# Patient Record
Sex: FEMALE | Race: BLACK OR AFRICAN AMERICAN | NOT HISPANIC OR LATINO | Employment: FULL TIME | URBAN - METROPOLITAN AREA
[De-identification: names, ages, dates, MRNs, and addresses within clinical notes are randomized per-mention and may not be internally consistent; named-entity substitution may affect disease eponyms.]

---

## 2021-02-11 ENCOUNTER — HISTORICAL (OUTPATIENT)
Dept: LAB | Facility: HOSPITAL | Age: 26
End: 2021-02-11

## 2022-12-04 ENCOUNTER — HOSPITAL ENCOUNTER (EMERGENCY)
Facility: HOSPITAL | Age: 27
Discharge: HOME | End: 2022-12-04
Attending: INTERNAL MEDICINE
Payer: MEDICAID

## 2022-12-04 VITALS
BODY MASS INDEX: 30.04 KG/M2 | WEIGHT: 198.19 LBS | SYSTOLIC BLOOD PRESSURE: 128 MMHG | OXYGEN SATURATION: 99 % | HEIGHT: 68 IN | RESPIRATION RATE: 16 BRPM | HEART RATE: 78 BPM | DIASTOLIC BLOOD PRESSURE: 67 MMHG | TEMPERATURE: 98 F

## 2022-12-04 DIAGNOSIS — S92.901A CLOSED FRACTURE OF RIGHT FOOT, INITIAL ENCOUNTER: Primary | ICD-10-CM

## 2022-12-04 DIAGNOSIS — R52 PAIN: ICD-10-CM

## 2022-12-04 PROCEDURE — 99283 EMERGENCY DEPT VISIT LOW MDM: CPT

## 2022-12-04 RX ORDER — DICLOFENAC SODIUM 75 MG/1
75 TABLET, DELAYED RELEASE ORAL 2 TIMES DAILY
Qty: 14 TABLET | Refills: 0 | Status: SHIPPED | OUTPATIENT
Start: 2022-12-04 | End: 2022-12-11
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
## 2023-06-18 ENCOUNTER — HOSPITAL ENCOUNTER (EMERGENCY)
Facility: HOSPITAL | Age: 28
Discharge: HOME | End: 2023-06-18
Attending: GENERAL ACUTE CARE HOSPITAL
Payer: MEDICAID

## 2023-06-18 VITALS
WEIGHT: 214 LBS | BODY MASS INDEX: 32.43 KG/M2 | RESPIRATION RATE: 18 BRPM | SYSTOLIC BLOOD PRESSURE: 137 MMHG | TEMPERATURE: 98 F | HEART RATE: 70 BPM | OXYGEN SATURATION: 100 % | DIASTOLIC BLOOD PRESSURE: 79 MMHG | HEIGHT: 68 IN

## 2023-06-18 DIAGNOSIS — R10.84 GENERALIZED ABDOMINAL PAIN AFFECTING PREGNANCY IN FIRST TRIMESTER: ICD-10-CM

## 2023-06-18 DIAGNOSIS — O21.9 NAUSEA AND VOMITING IN PREGNANCY: Primary | ICD-10-CM

## 2023-06-18 DIAGNOSIS — O26.891 GENERALIZED ABDOMINAL PAIN AFFECTING PREGNANCY IN FIRST TRIMESTER: ICD-10-CM

## 2023-06-18 LAB
ALBUMIN SERPL-MCNC: 3.9 G/DL (ref 3.5–5)
ALBUMIN/GLOB SERPL: 1.1 RATIO (ref 1.1–2)
ALP SERPL-CCNC: 70 UNIT/L (ref 40–150)
ALT SERPL-CCNC: 6 UNIT/L (ref 0–55)
APPEARANCE UR: ABNORMAL
AST SERPL-CCNC: 16 UNIT/L (ref 5–34)
B-HCG FREE SERPL-ACNC: ABNORMAL MIU/ML
B-HCG SERPL QL: POSITIVE
BASOPHILS # BLD AUTO: 0.01 X10(3)/MCL
BASOPHILS NFR BLD AUTO: 0.2 %
BILIRUB UR QL STRIP.AUTO: NEGATIVE MG/DL
BILIRUBIN DIRECT+TOT PNL SERPL-MCNC: 0.4 MG/DL
BUN SERPL-MCNC: 10 MG/DL (ref 7–18.7)
CALCIUM SERPL-MCNC: 9.5 MG/DL (ref 8.4–10.2)
CHLORIDE SERPL-SCNC: 107 MMOL/L (ref 98–107)
CO2 SERPL-SCNC: 23 MMOL/L (ref 22–29)
COLOR UR: ABNORMAL
CREAT SERPL-MCNC: 0.71 MG/DL (ref 0.55–1.02)
EOSINOPHIL # BLD AUTO: 0.05 X10(3)/MCL (ref 0–0.9)
EOSINOPHIL NFR BLD AUTO: 1.1 %
ERYTHROCYTE [DISTWIDTH] IN BLOOD BY AUTOMATED COUNT: 14.9 % (ref 11.5–17)
GFR SERPLBLD CREATININE-BSD FMLA CKD-EPI: >60 MLS/MIN/1.73/M2
GLOBULIN SER-MCNC: 3.4 GM/DL (ref 2.4–3.5)
GLUCOSE SERPL-MCNC: 86 MG/DL (ref 74–100)
GLUCOSE UR QL STRIP.AUTO: NEGATIVE MG/DL
HCT VFR BLD AUTO: 33.8 % (ref 37–47)
HGB BLD-MCNC: 10.6 G/DL (ref 12–16)
IMM GRANULOCYTES # BLD AUTO: 0.01 X10(3)/MCL (ref 0–0.04)
IMM GRANULOCYTES NFR BLD AUTO: 0.2 %
KETONES UR QL STRIP.AUTO: NEGATIVE MG/DL
LEUKOCYTE ESTERASE UR QL STRIP.AUTO: NEGATIVE UNIT/L
LYMPHOCYTES # BLD AUTO: 1.19 X10(3)/MCL (ref 0.6–4.6)
LYMPHOCYTES NFR BLD AUTO: 26.9 %
MCH RBC QN AUTO: 26.2 PG (ref 27–31)
MCHC RBC AUTO-ENTMCNC: 31.4 G/DL (ref 33–36)
MCV RBC AUTO: 83.5 FL (ref 80–94)
MONOCYTES # BLD AUTO: 0.65 X10(3)/MCL (ref 0.1–1.3)
MONOCYTES NFR BLD AUTO: 14.7 %
NEUTROPHILS # BLD AUTO: 2.51 X10(3)/MCL (ref 2.1–9.2)
NEUTROPHILS NFR BLD AUTO: 56.9 %
NITRITE UR QL STRIP.AUTO: NEGATIVE
PH UR STRIP.AUTO: 7 [PH]
PLATELET # BLD AUTO: 307 X10(3)/MCL (ref 130–400)
PMV BLD AUTO: 9.2 FL (ref 7.4–10.4)
POTASSIUM SERPL-SCNC: 4.2 MMOL/L (ref 3.5–5.1)
PROT SERPL-MCNC: 7.3 GM/DL (ref 6.4–8.3)
PROT UR QL STRIP.AUTO: NEGATIVE MG/DL
RBC # BLD AUTO: 4.05 X10(6)/MCL (ref 4.2–5.4)
RBC UR QL AUTO: NEGATIVE UNIT/L
SODIUM SERPL-SCNC: 137 MMOL/L (ref 136–145)
SP GR UR STRIP.AUTO: 1.02
UROBILINOGEN UR STRIP-ACNC: 2 MG/DL
WBC # SPEC AUTO: 4.42 X10(3)/MCL (ref 4.5–11.5)

## 2023-06-18 PROCEDURE — 99285 EMERGENCY DEPT VISIT HI MDM: CPT | Mod: 25

## 2023-06-18 PROCEDURE — 81003 URINALYSIS AUTO W/O SCOPE: CPT | Performed by: PHYSICIAN ASSISTANT

## 2023-06-18 PROCEDURE — 80053 COMPREHEN METABOLIC PANEL: CPT | Performed by: PHYSICIAN ASSISTANT

## 2023-06-18 PROCEDURE — 85025 COMPLETE CBC W/AUTO DIFF WBC: CPT | Performed by: PHYSICIAN ASSISTANT

## 2023-06-18 PROCEDURE — 96374 THER/PROPH/DIAG INJ IV PUSH: CPT

## 2023-06-18 PROCEDURE — 84702 CHORIONIC GONADOTROPIN TEST: CPT | Performed by: PHYSICIAN ASSISTANT

## 2023-06-18 PROCEDURE — 63600175 PHARM REV CODE 636 W HCPCS: Performed by: PHYSICIAN ASSISTANT

## 2023-06-18 PROCEDURE — 81025 URINE PREGNANCY TEST: CPT | Performed by: PHYSICIAN ASSISTANT

## 2023-06-18 RX ORDER — ONDANSETRON 2 MG/ML
4 INJECTION INTRAMUSCULAR; INTRAVENOUS
Status: COMPLETED | OUTPATIENT
Start: 2023-06-18 | End: 2023-06-18

## 2023-06-18 RX ORDER — ONDANSETRON 4 MG/1
4 TABLET, ORALLY DISINTEGRATING ORAL EVERY 8 HOURS PRN
Qty: 20 TABLET | Refills: 0 | Status: SHIPPED | OUTPATIENT
Start: 2023-06-18

## 2023-06-18 RX ADMIN — SODIUM CHLORIDE, POTASSIUM CHLORIDE, SODIUM LACTATE AND CALCIUM CHLORIDE 1000 ML: 600; 310; 30; 20 INJECTION, SOLUTION INTRAVENOUS at 06:06

## 2023-06-18 RX ADMIN — ONDANSETRON 4 MG: 2 INJECTION INTRAMUSCULAR; INTRAVENOUS at 06:06

## 2023-06-18 NOTE — ED PROVIDER NOTES
Encounter Date: 2023       History     Chief Complaint   Patient presents with    Nausea    Vomiting     Nausea and vomiting x 1 week. Last cycle was May 8th.      27-year-old  pregnant female presents to ED for evaluation of nausea and vomiting over the last week.  Patient reports that she found out she was pregnant this week.  Complains generalized abdominal pain when vomiting.  Denies any vaginal bleeding or dysuria.  States last menstrual cycle started on 2023.  Patient has not seen OB gyn for this pregnancy yet.  However has appointment with Dr. Meehan next week.    The history is provided by the patient. No  was used.   Review of patient's allergies indicates:  No Known Allergies  History reviewed. No pertinent past medical history.  Past Surgical History:   Procedure Laterality Date     SECTION       History reviewed. No pertinent family history.  Social History     Tobacco Use    Smoking status: Some Days     Types: Vaping with nicotine    Smokeless tobacco: Never   Substance Use Topics    Alcohol use: Not Currently    Drug use: Not Currently     Review of Systems   Constitutional:  Negative for chills, fatigue and fever.   Respiratory:  Negative for shortness of breath.    Cardiovascular:  Negative for chest pain.   Gastrointestinal:  Positive for abdominal pain, nausea and vomiting. Negative for diarrhea.   Genitourinary:  Negative for dysuria, flank pain, frequency, pelvic pain, urgency, vaginal bleeding and vaginal discharge.   Musculoskeletal:  Negative for myalgias.   All other systems reviewed and are negative.    Physical Exam     Initial Vitals [23 1746]   BP Pulse Resp Temp SpO2   121/77 78 18 98 °F (36.7 °C) 98 %      MAP       --         Physical Exam    Vitals reviewed.  Constitutional: She appears well-developed.   HENT:   Head: Normocephalic and atraumatic.   Right Ear: Tympanic membrane and external ear normal.   Left Ear: Tympanic membrane  and external ear normal.   Mouth/Throat: Uvula is midline, oropharynx is clear and moist and mucous membranes are normal. No trismus in the jaw. No uvula swelling. No oropharyngeal exudate, posterior oropharyngeal edema or posterior oropharyngeal erythema.   Eyes: Conjunctivae are normal. Pupils are equal, round, and reactive to light.   Neck: Neck supple.   Normal range of motion.  Cardiovascular:  Normal rate, regular rhythm and normal heart sounds.           Pulmonary/Chest: Breath sounds normal. She has no wheezes. She has no rhonchi. She has no rales.   Abdominal: Abdomen is soft. Bowel sounds are normal. There is no abdominal tenderness. There is no rebound and no guarding.   Genitourinary:    Genitourinary Comments: Exam deferred     Musculoskeletal:         General: Normal range of motion.      Cervical back: Normal range of motion and neck supple.     Neurological: She is alert and oriented to person, place, and time. She has normal strength. No cranial nerve deficit or sensory deficit. GCS score is 15. GCS eye subscore is 4. GCS verbal subscore is 5. GCS motor subscore is 6.   Skin: Skin is warm and dry.   Psychiatric: She has a normal mood and affect.       ED Course   Procedures  Labs Reviewed   URINALYSIS, REFLEX TO URINE CULTURE - Abnormal; Notable for the following components:       Result Value    Appearance, UA Slightly Cloudy (*)     Urobilinogen, UA 2.0 (*)     All other components within normal limits   PREGNANCY TEST, URINE RAPID - Abnormal; Notable for the following components:    Beta hCG Qualitative, Urine Positive (*)     All other components within normal limits   HCG, QUANTITATIVE - Abnormal; Notable for the following components:    Beta Human Chorionic Gonadotropin Quantitative 50,749.69 (*)     All other components within normal limits   CBC WITH DIFFERENTIAL - Abnormal; Notable for the following components:    WBC 4.42 (*)     RBC 4.05 (*)     Hgb 10.6 (*)     Hct 33.8 (*)     MCH 26.2  (*)     MCHC 31.4 (*)     All other components within normal limits   CBC W/ AUTO DIFFERENTIAL    Narrative:     The following orders were created for panel order CBC auto differential.  Procedure                               Abnormality         Status                     ---------                               -----------         ------                     CBC with Differential[[id number]]        Abnormal            Final result                 Please view results for these tests on the individual orders.   COMPREHENSIVE METABOLIC PANEL          Imaging Results              US OB <14 Wks TransAbd & TransVag, Single Gestation (XPD) (Preliminary result)  Result time 06/18/23 19:38:09      Preliminary result by Warren Morales MD (06/18/23 19:38:09)                   Narrative:    START OF REPORT:  Technique: First trimester ultrasound of the pelvis was performed with transabdominal and transvaginal images being obtained.    Comparison: None.    Clinical history: Abd pain, tech impression final image.    FINDINGS:  Uterus: The uterus measures 10.4 x 5.7 x 6.8 cm and with a volume of 208.3 ml. A large heterogenous mass is noted within the uterus measuring approximately 4.9 x 3.0 x 4.7 cm. This likely represents fibroid. Follow-up as clinically indicated.  Intrauterine gestation: A gravid uterus is present with a gestational sac identified and with a fetal heart rate of 104 bpm.  Gestational Sac: A gestational sac is present. The mean sac diameter measures (MSD) 1.9 cm. The gestational age by mean sac diameter (MSD) is 6 weeks 2 days.  Fetal heart tones: The fetal heart rate is 104 beats per minute.  Gestational age: The LMP is 05/08/2023. The gestational age by LMP is 5 weeks 6 days. The Estimated Date of Confinement based on menstrual period is 02/12/2024. The gestational age by ultrasound criteria is 6 weeks 2 days. This corresponds to an ultrasound estimated date of confinement of 02/09/2024. The CRL measures 0.5 cm  and corresponds to 6 weeks and 1 day.    Adnexa:  Right Ovary: The right ovary is not definitely visualized.  Left Ovary: The left ovary measures 3.5 x 2.2 x 2.4 cm and with a volume of 9.5 ml.    Fluid: Minimal free fluid is seen in the cul de sac.      Impression:  1. Minimal free fluid is seen in the cul de sac.  2. A gravid uterus is present with a gestational sac identified and with a fetal heart rate of 104 bpm.  3. A gestational sac is present. The mean sac diameter measures (MSD) 1.9 cm. The gestational age by mean sac diameter (MSD) is 6 weeks.  4. Recommend short term serial clinical laboratory and ultrasound followup. Details as above.                                         Medications   lactated ringers bolus 1,000 mL ( Intravenous Stopped 23)   ondansetron injection 4 mg (4 mg Intravenous Given 23)     Medical Decision Making:   Initial Assessment:   27-year-old  pregnant female presents to ED for evaluation of nausea and vomiting over the last week.  Patient reports that she found out she was pregnant this week.  Complains generalized abdominal pain when vomiting.  Denies any vaginal bleeding or dysuria.  States last menstrual cycle started on 2023.  Patient has not seen OB gyn for this pregnancy yet.  However has appointment with Dr. Meehan next week.  Differential Diagnosis:   Judging by the patient's chief complaint and pertinent history, the patient has the following possible differential diagnoses, including but not limited to the following.  Some of these are deemed to be lower likelihood and some more likely based on my physical exam and history combined with possible lab work and/or imaging studies. Please see the pertinent studies, and refer to the HPI.  Some of these diagnoses will take further evaluation to fully rule out, perhaps as an outpatient and the patient was encouraged to follow up when discharged for more comprehensive evaluation.    ectopic  pregnancy,  threatened , ovarian cyst, ovarian torsion,UTI/pyelo, PID, BV, candidiasis, kidney stone, appendicitis, GERD, hyperemesis    Clinical Tests:   Lab Tests: Ordered and Reviewed  Radiological Study: Ordered and Reviewed  ED Management:  19-year-old pregnant female presents to ED for evaluation of nausea and vomiting with abdominal pain over the last week.  Recently found out she was pregnant.  Denies any vaginal bleeding.  Labs unremarkable.  H&H stable.  Patient given IV fluids and Zofran.  Tolerating liquids while in the ED.  Ultrasound obtained showing IUP.  Will send home with short course of antiemetic.  Discussed follow-up with Dr. Meehan next week as scheduled appointment. Return ED precautions given.  I provided counseling to patient with regard to condition, the treatment plan, specific conditions for return, and the importance of follow up. Detailed written and verbal instructions provided to patient and she expressed a verbal understanding of the discharge instructions and overall management plan. Reiterated the importance of medication administration and safety. Advised patient to follow up with primary care provider in 3-5 days or sooner if needed.  Answered questions at this time. The patient is stable for discharge.                             Clinical Impression:   Final diagnoses:  [O21.9] Nausea and vomiting in pregnancy (Primary)  [O26.891, R10.84] Generalized abdominal pain affecting pregnancy in first trimester        ED Disposition Condition    Discharge Stable          ED Prescriptions       Medication Sig Dispense Start Date End Date Auth. Provider    ondansetron (ZOFRAN-ODT) 4 MG TbDL Take 1 tablet (4 mg total) by mouth every 8 (eight) hours as needed. 20 tablet 2023 -- RIK Rios          Follow-up Information       Follow up With Specialties Details Why Contact Info    Mj Pinzon MD Family Medicine   [address] [place] [address]  [phone]      Al  LEOPOLDO Meehan MD Obstetrics and Gynecology Go to  Critical access hospital appointment [address]  [facility] [address]  [phone]               RIK Rios  06/18/23 2019

## 2023-06-18 NOTE — Clinical Note
"Angela Abbott" Vira was seen and treated in our emergency department on 6/18/2023.  She may return to work on 06/19/2023.       If you have any questions or concerns, please don't hesitate to call.       RN    "

## 2023-06-19 NOTE — DISCHARGE INSTRUCTIONS
Make sure drinking plenty of fluids.  Eat small meals throughout the day to help with nausea and vomiting.  Use Zofran as needed.  Follow-up with Dr. Meehan as scheduled appointment next week.

## 2023-06-25 ENCOUNTER — HOSPITAL ENCOUNTER (EMERGENCY)
Facility: HOSPITAL | Age: 28
Discharge: HOME | End: 2023-06-25
Attending: EMERGENCY MEDICINE
Payer: MEDICAID

## 2023-06-25 VITALS
WEIGHT: 211.19 LBS | DIASTOLIC BLOOD PRESSURE: 84 MMHG | HEART RATE: 78 BPM | OXYGEN SATURATION: 100 % | BODY MASS INDEX: 32.11 KG/M2 | TEMPERATURE: 99 F | RESPIRATION RATE: 18 BRPM | SYSTOLIC BLOOD PRESSURE: 133 MMHG

## 2023-06-25 DIAGNOSIS — R82.998 LEUKOCYTES IN URINE: ICD-10-CM

## 2023-06-25 DIAGNOSIS — O21.9 NAUSEA AND VOMITING IN PREGNANCY: Primary | ICD-10-CM

## 2023-06-25 LAB
ALBUMIN SERPL-MCNC: 4.3 G/DL (ref 3.5–5)
ALBUMIN/GLOB SERPL: 1.1 RATIO (ref 1.1–2)
ALP SERPL-CCNC: 68 UNIT/L (ref 40–150)
ALT SERPL-CCNC: 6 UNIT/L (ref 0–55)
APPEARANCE UR: ABNORMAL
AST SERPL-CCNC: 18 UNIT/L (ref 5–34)
B-HCG SERPL QL: POSITIVE
BACTERIA #/AREA URNS AUTO: ABNORMAL /HPF
BASOPHILS # BLD AUTO: 0.01 X10(3)/MCL
BASOPHILS NFR BLD AUTO: 0.2 %
BILIRUB UR QL STRIP.AUTO: ABNORMAL MG/DL
BILIRUBIN DIRECT+TOT PNL SERPL-MCNC: 0.4 MG/DL
BUN SERPL-MCNC: 10 MG/DL (ref 7–18.7)
CALCIUM SERPL-MCNC: 9.9 MG/DL (ref 8.4–10.2)
CHLORIDE SERPL-SCNC: 103 MMOL/L (ref 98–107)
CO2 SERPL-SCNC: 23 MMOL/L (ref 22–29)
COLOR UR: YELLOW
CREAT SERPL-MCNC: 0.76 MG/DL (ref 0.55–1.02)
EOSINOPHIL # BLD AUTO: 0.01 X10(3)/MCL (ref 0–0.9)
EOSINOPHIL NFR BLD AUTO: 0.2 %
ERYTHROCYTE [DISTWIDTH] IN BLOOD BY AUTOMATED COUNT: 14.4 % (ref 11.5–17)
FLUAV AG UPPER RESP QL IA.RAPID: NOT DETECTED
FLUBV AG UPPER RESP QL IA.RAPID: NOT DETECTED
GFR SERPLBLD CREATININE-BSD FMLA CKD-EPI: >60 MLS/MIN/1.73/M2
GLOBULIN SER-MCNC: 4 GM/DL (ref 2.4–3.5)
GLUCOSE SERPL-MCNC: 102 MG/DL (ref 74–100)
GLUCOSE UR QL STRIP.AUTO: NEGATIVE MG/DL
HCT VFR BLD AUTO: 36.8 % (ref 37–47)
HGB BLD-MCNC: 11.6 G/DL (ref 12–16)
IMM GRANULOCYTES # BLD AUTO: 0 X10(3)/MCL (ref 0–0.04)
IMM GRANULOCYTES NFR BLD AUTO: 0 %
KETONES UR QL STRIP.AUTO: ABNORMAL MG/DL
LEUKOCYTE ESTERASE UR QL STRIP.AUTO: ABNORMAL UNIT/L
LYMPHOCYTES # BLD AUTO: 1.29 X10(3)/MCL (ref 0.6–4.6)
LYMPHOCYTES NFR BLD AUTO: 26.2 %
MAGNESIUM SERPL-MCNC: 2 MG/DL (ref 1.6–2.6)
MCH RBC QN AUTO: 26.1 PG (ref 27–31)
MCHC RBC AUTO-ENTMCNC: 31.5 G/DL (ref 33–36)
MCV RBC AUTO: 82.7 FL (ref 80–94)
MONOCYTES # BLD AUTO: 0.54 X10(3)/MCL (ref 0.1–1.3)
MONOCYTES NFR BLD AUTO: 11 %
MUCOUS THREADS URNS QL MICRO: ABNORMAL /LPF
NEUTROPHILS # BLD AUTO: 3.08 X10(3)/MCL (ref 2.1–9.2)
NEUTROPHILS NFR BLD AUTO: 62.4 %
NITRITE UR QL STRIP.AUTO: NEGATIVE
PH UR STRIP.AUTO: 7 [PH]
PLATELET # BLD AUTO: 325 X10(3)/MCL (ref 130–400)
PMV BLD AUTO: 9.2 FL (ref 7.4–10.4)
POTASSIUM SERPL-SCNC: 3.9 MMOL/L (ref 3.5–5.1)
PROT SERPL-MCNC: 8.3 GM/DL (ref 6.4–8.3)
PROT UR QL STRIP.AUTO: 30 MG/DL
RBC # BLD AUTO: 4.45 X10(6)/MCL (ref 4.2–5.4)
RBC #/AREA URNS AUTO: ABNORMAL /HPF
RBC UR QL AUTO: NEGATIVE UNIT/L
SARS-COV-2 RNA RESP QL NAA+PROBE: NOT DETECTED
SODIUM SERPL-SCNC: 136 MMOL/L (ref 136–145)
SP GR UR STRIP.AUTO: 1.02
SQUAMOUS #/AREA URNS AUTO: ABNORMAL /HPF
UROBILINOGEN UR STRIP-ACNC: 4 MG/DL
WBC # SPEC AUTO: 4.93 X10(3)/MCL (ref 4.5–11.5)
WBC #/AREA URNS AUTO: ABNORMAL /HPF

## 2023-06-25 PROCEDURE — 81001 URINALYSIS AUTO W/SCOPE: CPT | Performed by: NURSE PRACTITIONER

## 2023-06-25 PROCEDURE — 99284 EMERGENCY DEPT VISIT MOD MDM: CPT

## 2023-06-25 PROCEDURE — 25000003 PHARM REV CODE 250: Performed by: NURSE PRACTITIONER

## 2023-06-25 PROCEDURE — 81025 URINE PREGNANCY TEST: CPT | Performed by: NURSE PRACTITIONER

## 2023-06-25 PROCEDURE — 85025 COMPLETE CBC W/AUTO DIFF WBC: CPT | Performed by: NURSE PRACTITIONER

## 2023-06-25 PROCEDURE — 80053 COMPREHEN METABOLIC PANEL: CPT | Performed by: NURSE PRACTITIONER

## 2023-06-25 PROCEDURE — 63600175 PHARM REV CODE 636 W HCPCS: Performed by: NURSE PRACTITIONER

## 2023-06-25 PROCEDURE — 96361 HYDRATE IV INFUSION ADD-ON: CPT

## 2023-06-25 PROCEDURE — 96365 THER/PROPH/DIAG IV INF INIT: CPT

## 2023-06-25 PROCEDURE — 0240U COVID/FLU A&B PCR: CPT | Performed by: NURSE PRACTITIONER

## 2023-06-25 PROCEDURE — 83735 ASSAY OF MAGNESIUM: CPT | Performed by: NURSE PRACTITIONER

## 2023-06-25 RX ORDER — NITROFURANTOIN 25; 75 MG/1; MG/1
100 CAPSULE ORAL
Status: COMPLETED | OUTPATIENT
Start: 2023-06-25 | End: 2023-06-25

## 2023-06-25 RX ORDER — NITROFURANTOIN 25; 75 MG/1; MG/1
100 CAPSULE ORAL 2 TIMES DAILY
Qty: 6 CAPSULE | Refills: 0 | Status: SHIPPED | OUTPATIENT
Start: 2023-06-25 | End: 2023-06-28

## 2023-06-25 RX ADMIN — PROMETHAZINE HYDROCHLORIDE 25 MG: 25 INJECTION INTRAMUSCULAR; INTRAVENOUS at 10:06

## 2023-06-25 RX ADMIN — SODIUM CHLORIDE, POTASSIUM CHLORIDE, SODIUM LACTATE AND CALCIUM CHLORIDE 1000 ML: 600; 310; 30; 20 INJECTION, SOLUTION INTRAVENOUS at 09:06

## 2023-06-25 RX ADMIN — NITROFURANTOIN (MONOHYDRATE/MACROCRYSTALS) 100 MG: 75; 25 CAPSULE ORAL at 11:06

## 2023-06-26 NOTE — ED NOTES
Pt ambulated to ed rm 5 from [facility]. Aaox4. Pt reports n/v for 3 days. Denies diarrhea/fever/abd pain/vag dischage. Pt is 6weeks pregnant. Pt is in no distress. Wctm

## 2023-06-26 NOTE — ED PROVIDER NOTES
Encounter Date: 2023       History     Chief Complaint   Patient presents with    Emesis During Pregnancy     States began on Friday with N/V and frontal headache. 7 weeks preg. . No relief with zofran rx.      The patient is a 27-year-old female who is a , 7 weeks pregnant with significant history of hyperemesis gravidarum for her past pregnancies presents to the ER for evaluation and treatment of nausea and vomiting, severe with a frontal headache ongoing since last Friday.  She states that she is unable to eat or drink anything without extreme vomiting.  Her gyn is Dr. Meehan in [place].  She denies any vaginal bleeding, denies any vaginal discharge, she denies any abdominal pain, states she came into the emergency room for rehydration and nausea medicine.  She denies any sob, denies any cp, denies any sick contacts or diarrhea, afebrile, no dysuria.    Review of patient's allergies indicates:  No Known Allergies  No past medical history on file.  Past Surgical History:   Procedure Laterality Date     SECTION       No family history on file.  Social History     Tobacco Use    Smoking status: Some Days     Types: Vaping with nicotine    Smokeless tobacco: Never   Substance Use Topics    Alcohol use: Not Currently    Drug use: Not Currently     Review of Systems   All other systems reviewed and are negative.    Physical Exam     Initial Vitals [23 192]   BP Pulse Resp Temp SpO2   128/82 71 18 99.1 °F (37.3 °C) 98 %      MAP       --         Physical Exam    Nursing note and vitals reviewed.  Constitutional: She appears well-developed and well-nourished.   HENT:   Head: Normocephalic and atraumatic.   Eyes: Conjunctivae are normal.   Neck: Neck supple.   Cardiovascular:  Normal rate, regular rhythm and normal heart sounds.           Pulmonary/Chest: Breath sounds normal.   Abdominal: Abdomen is soft.   Musculoskeletal:         General: Normal range of motion.      Cervical back: Neck  supple.     Neurological: She is alert and oriented to person, place, and time. She has normal strength. GCS score is 15. GCS eye subscore is 4. GCS verbal subscore is 5. GCS motor subscore is 6.   Skin: Skin is warm and dry.   Psychiatric: She has a normal mood and affect. Her behavior is normal. Judgment and thought content normal.       ED Course   Procedures  Labs Reviewed   URINALYSIS, REFLEX TO URINE CULTURE - Abnormal; Notable for the following components:       Result Value    Appearance, UA SL CLOUDY (*)     Protein, UA 30 (*)     Ketones, UA Trace (*)     Bilirubin, UA Small (*)     Urobilinogen, UA 4.0 (*)     Leukocyte Esterase, UA Trace (*)     All other components within normal limits   PREGNANCY TEST, URINE RAPID - Abnormal; Notable for the following components:    Beta hCG Qualitative, Urine Positive (*)     All other components within normal limits   URINALYSIS, MICROSCOPIC - Abnormal; Notable for the following components:    Mucous, UA Small (*)     All other components within normal limits   COMPREHENSIVE METABOLIC PANEL - Abnormal; Notable for the following components:    Glucose Level 102 (*)     Globulin 4.0 (*)     All other components within normal limits   CBC WITH DIFFERENTIAL - Abnormal; Notable for the following components:    Hgb 11.6 (*)     Hct 36.8 (*)     MCH 26.1 (*)     MCHC 31.5 (*)     All other components within normal limits   COVID/FLU A&B PCR - Normal    Narrative:     The Xpert Xpress SARS-CoV-2/FLU/RSV plus is a rapid, multiplexed real-time PCR test intended for the simultaneous qualitative detection and differentiation of SARS-CoV-2, Influenza A, Influenza B, and respiratory syncytial virus (RSV) viral RNA in either nasopharyngeal swab or nasal swab specimens.         MAGNESIUM - Normal   CBC W/ AUTO DIFFERENTIAL    Narrative:     The following orders were created for panel order CBC auto differential.  Procedure                               Abnormality         Status                      ---------                               -----------         ------                     CBC with Differential[[id number]]        Abnormal            Final result                 Please view results for these tests on the individual orders.     Admission on 06/25/2023, Discharged on 06/25/2023   Component Date Value Ref Range Status    Influenza A PCR 06/25/2023 Not Detected  Not Detected Final    Influenza B PCR 06/25/2023 Not Detected  Not Detected Final    SARS-CoV-2 PCR 06/25/2023 Not Detected  Not Detected, Negative, Invalid Final    Color, UA 06/25/2023 Yellow  Yellow, Light-Yellow, Dark Yellow, Shannan, Straw Final    Appearance, UA 06/25/2023 SL CLOUDY (A)  Clear Final    Specific Gravity, UA 06/25/2023 1.020   Final    pH, UA 06/25/2023 7.0  5.0 - 8.5 Final    Protein, UA 06/25/2023 30 (A)  Negative mg/dL Final    Glucose, UA 06/25/2023 Negative  Negative, Normal mg/dL Final    Ketones, UA 06/25/2023 Trace (A)  Negative mg/dL Final    Blood, UA 06/25/2023 Negative  Negative unit/L Final    Bilirubin, UA 06/25/2023 Small (A)  Negative mg/dL Final    Urobilinogen, UA 06/25/2023 4.0 (A)  0.2, 1.0, Normal mg/dL Final    Nitrites, UA 06/25/2023 Negative  Negative Final    Leukocyte Esterase, UA 06/25/2023 Trace (A)  Negative unit/L Final    Beta hCG Qualitative, Urine 06/25/2023 Positive (A)  Negative Final    Bacteria, UA 06/25/2023 None Seen  None Seen, Rare, Occasional /HPF Final    Mucous, UA 06/25/2023 Small (A)  None Seen /LPF Final    RBC, UA 06/25/2023 0-2  None Seen, 0-2, 3-5, 0-5 /HPF Final    WBC, UA 06/25/2023 0-2  None Seen, 0-2, 3-5, 0-5 /HPF Final    Squamous Epithelial Cells, UA 06/25/2023 Rare  None Seen, Rare, Occasional, Occ /HPF Final    Sodium Level 06/25/2023 136  136 - 145 mmol/L Final    Potassium Level 06/25/2023 3.9  3.5 - 5.1 mmol/L Final    Chloride 06/25/2023 103  98 - 107 mmol/L Final    Carbon Dioxide 06/25/2023 23  22 - 29 mmol/L Final    Glucose Level 06/25/2023 102  (H)  74 - 100 mg/dL Final    Blood Urea Nitrogen 06/25/2023 10.0  7.0 - 18.7 mg/dL Final    Creatinine 06/25/2023 0.76  0.55 - 1.02 mg/dL Final    Calcium Level Total 06/25/2023 9.9  8.4 - 10.2 mg/dL Final    Protein Total 06/25/2023 8.3  6.4 - 8.3 gm/dL Final    Albumin Level 06/25/2023 4.3  3.5 - 5.0 g/dL Final    Globulin 06/25/2023 4.0 (H)  2.4 - 3.5 gm/dL Final    Albumin/Globulin Ratio 06/25/2023 1.1  1.1 - 2.0 ratio Final    Bilirubin Total 06/25/2023 0.4  <=1.5 mg/dL Final    Alkaline Phosphatase 06/25/2023 68  40 - 150 unit/L Final    Alanine Aminotransferase 06/25/2023 6  0 - 55 unit/L Final    Aspartate Aminotransferase 06/25/2023 18  5 - 34 unit/L Final    eGFR 06/25/2023 >60  mls/min/1.73/m2 Final    Magnesium Level 06/25/2023 2.00  1.60 - 2.60 mg/dL Final    WBC 06/25/2023 4.93  4.50 - 11.50 x10(3)/mcL Final    RBC 06/25/2023 4.45  4.20 - 5.40 x10(6)/mcL Final    Hgb 06/25/2023 11.6 (L)  12.0 - 16.0 g/dL Final    Hct 06/25/2023 36.8 (L)  37.0 - 47.0 % Final    MCV 06/25/2023 82.7  80.0 - 94.0 fL Final    MCH 06/25/2023 26.1 (L)  27.0 - 31.0 pg Final    MCHC 06/25/2023 31.5 (L)  33.0 - 36.0 g/dL Final    RDW 06/25/2023 14.4  11.5 - 17.0 % Final    Platelet 06/25/2023 325  130 - 400 x10(3)/mcL Final    MPV 06/25/2023 9.2  7.4 - 10.4 fL Final    Neut % 06/25/2023 62.4  % Final    Lymph % 06/25/2023 26.2  % Final    Mono % 06/25/2023 11.0  % Final    Eos % 06/25/2023 0.2  % Final    Basophil % 06/25/2023 0.2  % Final    Lymph # 06/25/2023 1.29  0.6 - 4.6 x10(3)/mcL Final    Neut # 06/25/2023 3.08  2.1 - 9.2 x10(3)/mcL Final    Mono # 06/25/2023 0.54  0.1 - 1.3 x10(3)/mcL Final    Eos # 06/25/2023 0.01  0 - 0.9 x10(3)/mcL Final    Baso # 06/25/2023 0.01  <=0.2 x10(3)/mcL Final    IG# 06/25/2023 0.00  0 - 0.04 x10(3)/mcL Final    IG% 06/25/2023 0.0  % Final           Imaging Results    None          Medications   lactated ringers bolus 1,000 mL (0 mLs Intravenous Stopped 6/25/23 0875)   promethazine  (PHENERGAN) 25 mg in dextrose 5 % (D5W) 50 mL IVPB (0 mg Intravenous Stopped 6/25/23 2303)   nitrofurantoin (macrocrystal-monohydrate) 100 MG capsule 100 mg (100 mg Oral Given 6/25/23 2318)                 ED Course as of 06/26/23 0109   Sun Jun 25, 2023   2100 Differential diagnosis is include dehydration, UTI, hyperemesis gravidarum, electrolyte imbalance     [EB]   2200 Her physical exam was essentially unremarkable, her labs were essentially unremarkable, her Mag was 2, no white count, no left shift, slightly anemic, no electrolyte imbalance, no viral illness, she does have a slight leukocytosis of her urine for which she will be covered with Macrobid.  She received 1 L of normal saline while in the ED. I discussed the antiemetic with Dr. Fisher and he felt that promethazine was the best choice while in the emergency department.  I discussed the risk versus the benefit of early pregnancy and antiemetics and told her that I will defer outpatient treatment to her gyn.  I advised her to call Dr. Meehan's office in the morning and she states that she will.  Her vital signs remained stable while in the emergency room, she is not vomited while in the ED. I will discharge her at this time. [EB]      ED Course User Index  [EB] NICK Turk                 Clinical Impression:   Final diagnoses:  [O21.9] Nausea and vomiting in pregnancy (Primary)  [R82.998] Leukocytes in urine        ED Disposition Condition    Discharge Stable          ED Prescriptions       Medication Sig Dispense Start Date End Date Auth. Provider    nitrofurantoin, macrocrystal-monohydrate, (MACROBID) 100 MG capsule Take 1 capsule (100 mg total) by mouth 2 (two) times daily. for 3 days 6 capsule 6/25/2023 6/28/2023 NICK Turk          Follow-up Information       Follow up With Specialties Details Why Contact Info    Mj Pinzon MD Family Medicine   [address] [place] [address]  [phone]      Al Meehan,  MD Obstetrics and Gynecology In 1 day  [address]  [facility] [address]  [phone]               Maggie Thrasher, [facility]  06/26/23 0112

## 2023-08-16 ENCOUNTER — HOSPITAL ENCOUNTER (EMERGENCY)
Facility: HOSPITAL | Age: 28
Discharge: HOME | End: 2023-08-16
Attending: EMERGENCY MEDICINE
Payer: MEDICAID

## 2023-08-16 VITALS
BODY MASS INDEX: 33.34 KG/M2 | HEART RATE: 71 BPM | TEMPERATURE: 98 F | HEIGHT: 68 IN | WEIGHT: 220 LBS | DIASTOLIC BLOOD PRESSURE: 87 MMHG | RESPIRATION RATE: 18 BRPM | OXYGEN SATURATION: 100 % | SYSTOLIC BLOOD PRESSURE: 152 MMHG

## 2023-08-16 DIAGNOSIS — R10.9 ABDOMINAL PAIN: ICD-10-CM
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
## 2023-08-16 LAB
APPEARANCE UR: CLEAR
BACTERIA #/AREA URNS AUTO: ABNORMAL /HPF
BILIRUB UR QL STRIP.AUTO: NEGATIVE
COLOR UR: YELLOW
GLUCOSE UR QL STRIP.AUTO: NEGATIVE
KETONES UR QL STRIP.AUTO: NEGATIVE
LEUKOCYTE ESTERASE UR QL STRIP.AUTO: ABNORMAL
MUCOUS THREADS URNS QL MICRO: ABNORMAL /LPF
NITRITE UR QL STRIP.AUTO: NEGATIVE
PH UR STRIP.AUTO: 7 [PH]
PROT UR QL STRIP.AUTO: NEGATIVE
RBC #/AREA URNS AUTO: ABNORMAL /HPF
RBC UR QL AUTO: NEGATIVE
SP GR UR STRIP.AUTO: >=1.03
SQUAMOUS #/AREA URNS AUTO: ABNORMAL /HPF
UROBILINOGEN UR STRIP-ACNC: 4
WBC #/AREA URNS AUTO: ABNORMAL /HPF

## 2023-08-16 PROCEDURE — 81001 URINALYSIS AUTO W/SCOPE: CPT | Performed by: EMERGENCY MEDICINE

## 2023-08-16 PROCEDURE — 99284 EMERGENCY DEPT VISIT MOD MDM: CPT
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
## 2023-08-16 NOTE — Clinical Note
"Angela Abbott" Vira was seen and treated in our emergency department on 8/16/2023.  She may return to work on 08/17/2023.       If you have any questions or concerns, please don't hesitate to call.       RN    "

## 2023-08-16 NOTE — Clinical Note
"Angela Abbott"Vira was seen and treated in our emergency department on 8/16/2023.  She may return to work on 08/18/2023.       If you have any questions or concerns, please don't hesitate to call.      Nelli Gan MD"

## 2025-01-01 ENCOUNTER — HOSPITAL ENCOUNTER (EMERGENCY)
Facility: HOSPITAL | Age: 30
Discharge: HOME | End: 2025-01-02
Attending: EMERGENCY MEDICINE
Payer: MEDICAID

## 2025-01-01 DIAGNOSIS — J10.1 INFLUENZA A: Primary | ICD-10-CM

## 2025-01-01 DIAGNOSIS — O20.0 THREATENED ABORTION: ICD-10-CM

## 2025-01-01 LAB
B-HCG FREE SERPL-ACNC: ABNORMAL MIU/ML
B-HCG UR QL: POSITIVE
BASOPHILS # BLD AUTO: 0.01 X10(3)/MCL
BASOPHILS NFR BLD AUTO: 0.2 %
BILIRUB UR QL STRIP.AUTO: NEGATIVE
CLARITY UR: CLEAR
COLOR UR AUTO: YELLOW
EOSINOPHIL # BLD AUTO: 0.01 X10(3)/MCL (ref 0–0.9)
EOSINOPHIL NFR BLD AUTO: 0.2 %
ERYTHROCYTE [DISTWIDTH] IN BLOOD BY AUTOMATED COUNT: 18 % (ref 11.5–17)
FLUAV AG UPPER RESP QL IA.RAPID: DETECTED
FLUBV AG UPPER RESP QL IA.RAPID: NOT DETECTED
GLUCOSE UR QL STRIP: NEGATIVE
HCT VFR BLD AUTO: 32.7 % (ref 37–47)
HGB BLD-MCNC: 10.2 G/DL (ref 12–16)
HGB UR QL STRIP: NEGATIVE
IMM GRANULOCYTES # BLD AUTO: 0.02 X10(3)/MCL (ref 0–0.04)
IMM GRANULOCYTES NFR BLD AUTO: 0.4 %
KETONES UR QL STRIP: NEGATIVE
LEUKOCYTE ESTERASE UR QL STRIP: NEGATIVE
LYMPHOCYTES # BLD AUTO: 0.84 X10(3)/MCL (ref 0.6–4.6)
LYMPHOCYTES NFR BLD AUTO: 17.4 %
MCH RBC QN AUTO: 24.7 PG (ref 27–31)
MCHC RBC AUTO-ENTMCNC: 31.2 G/DL (ref 33–36)
MCV RBC AUTO: 79.2 FL (ref 80–94)
MONOCYTES # BLD AUTO: 0.78 X10(3)/MCL (ref 0.1–1.3)
MONOCYTES NFR BLD AUTO: 16.2 %
NEUTROPHILS # BLD AUTO: 3.16 X10(3)/MCL (ref 2.1–9.2)
NEUTROPHILS NFR BLD AUTO: 65.6 %
NITRITE UR QL STRIP: NEGATIVE
PH UR STRIP: 6 [PH]
PLATELET # BLD AUTO: 280 X10(3)/MCL (ref 130–400)
PMV BLD AUTO: 8.8 FL (ref 7.4–10.4)
PROT UR QL STRIP: NEGATIVE
RBC # BLD AUTO: 4.13 X10(6)/MCL (ref 4.2–5.4)
RSV A 5' UTR RNA NPH QL NAA+PROBE: NOT DETECTED
SARS-COV-2 RNA RESP QL NAA+PROBE: NOT DETECTED
SP GR UR STRIP.AUTO: 1.01 (ref 1–1.03)
UROBILINOGEN UR STRIP-ACNC: 0.2
WBC # BLD AUTO: 4.82 X10(3)/MCL (ref 4.5–11.5)

## 2025-01-01 PROCEDURE — 81025 URINE PREGNANCY TEST: CPT

## 2025-01-01 PROCEDURE — 0241U COVID/RSV/FLU A&B PCR: CPT

## 2025-01-01 PROCEDURE — 99285 EMERGENCY DEPT VISIT HI MDM: CPT | Mod: 25

## 2025-01-01 PROCEDURE — 84702 CHORIONIC GONADOTROPIN TEST: CPT | Performed by: EMERGENCY MEDICINE

## 2025-01-01 PROCEDURE — 81003 URINALYSIS AUTO W/O SCOPE: CPT

## 2025-01-01 PROCEDURE — 85025 COMPLETE CBC W/AUTO DIFF WBC: CPT | Performed by: EMERGENCY MEDICINE

## 2025-01-01 RX ORDER — BENZONATATE 200 MG/1
200 CAPSULE ORAL 3 TIMES DAILY PRN
Qty: 30 CAPSULE | Refills: 0 | Status: SHIPPED | OUTPATIENT
Start: 2025-01-01 | End: 2025-01-11

## 2025-01-01 NOTE — Clinical Note
"Angela Abbott" Vira was seen and treated in our emergency department on 1/1/2025.  She may return to work on 01/09/2025.       If you have any questions or concerns, please don't hesitate to call.       RN    "

## 2025-01-02 VITALS
WEIGHT: 220 LBS | TEMPERATURE: 98 F | BODY MASS INDEX: 34.53 KG/M2 | HEART RATE: 86 BPM | RESPIRATION RATE: 18 BRPM | DIASTOLIC BLOOD PRESSURE: 82 MMHG | HEIGHT: 67 IN | SYSTOLIC BLOOD PRESSURE: 128 MMHG | OXYGEN SATURATION: 100 %

## 2025-01-02 NOTE — ED PROVIDER NOTES
Encounter Date: 2025       History     Chief Complaint   Patient presents with    Nausea    Headache    Cough     Cough   headache  bodyaches   started 2 days ago   3 at home with flu    6wks preg   spotting today     The history is provided by the patient.   Nausea  This is a new problem. The current episode started more than 2 days ago. The problem has not changed since onset.Associated symptoms include abdominal pain and headaches. Pertinent negatives include no chest pain and no shortness of breath. Nothing aggravates the symptoms. Nothing relieves the symptoms.   Headache   The current episode started in the past 7 days. The problem occurs intermittently. The quality of the pain is described as dull. Associated symptoms include abdominal pain, coughing and nausea. Pertinent negatives include no back pain, fever, sore throat or weakness.   Cough  This is a new problem. The current episode started several days ago. The problem has been unchanged. The cough is Non-productive. The maximum temperature recorded prior to her arrival was 100 - 100.9 F. Associated symptoms include headaches. Pertinent negatives include no chest pain, no sore throat and no shortness of breath.   States several of her kids have the flu.  Also notes she is 6 weeks pregnant and has been having spotting and lower abdominal pain.  She is  8 para 5, Ab 2    Review of patient's allergies indicates:  No Known Allergies  No past medical history on file.  Past Surgical History:   Procedure Laterality Date     SECTION       No family history on file.  Social History     Tobacco Use    Smoking status: Some Days     Types: Vaping with nicotine    Smokeless tobacco: Never   Substance Use Topics    Alcohol use: Not Currently    Drug use: Not Currently     Review of Systems   Constitutional:  Negative for fever.   HENT:  Negative for sore throat.    Respiratory:  Positive for cough. Negative for shortness of breath.    Cardiovascular:   Negative for chest pain.   Gastrointestinal:  Positive for abdominal pain and nausea.   Genitourinary:  Positive for vaginal bleeding. Negative for dysuria.   Musculoskeletal:  Negative for back pain.   Skin:  Negative for rash.   Neurological:  Positive for headaches. Negative for weakness.   Hematological:  Does not bruise/bleed easily.       Physical Exam     Initial Vitals [01/01/25 1747]   BP Pulse Resp Temp SpO2   (!) 134/92 86 16 98.6 °F (37 °C) 100 %      MAP       --         Physical Exam    Nursing note and vitals reviewed.  Constitutional: She appears well-developed and well-nourished.   HENT:   Head: Normocephalic and atraumatic.   Right Ear: External ear normal.   Left Ear: External ear normal.   Eyes: Conjunctivae and EOM are normal. Pupils are equal, round, and reactive to light.   Neck: Neck supple.   Normal range of motion.  Cardiovascular:  Normal rate, regular rhythm, normal heart sounds and intact distal pulses.           Pulmonary/Chest: Breath sounds normal.   Abdominal: Abdomen is soft. Bowel sounds are normal. There is no abdominal tenderness.   obese   Musculoskeletal:         General: Normal range of motion.      Cervical back: Normal range of motion and neck supple.     Neurological: She is alert and oriented to person, place, and time. GCS score is 15. GCS eye subscore is 4. GCS verbal subscore is 5. GCS motor subscore is 6.   Skin: Skin is warm and dry. Capillary refill takes less than 2 seconds.   Psychiatric: She has a normal mood and affect. Her behavior is normal. Judgment and thought content normal.         ED Course   ED US Pelvis OB    Date/Time: 1/1/2025 8:29 PM    Performed by: Bassam Sanchez MD  Authorized by: Bassam Sanchez MD    Indication:  Pregnancy, Abdominal pain and Vaginal bleeding  Identified Structures:  Uterus sagittal and Uterus transverse  Definitive IUP:  Indeterminant  Uterine Contents:  Gestational sac  Free fluid in cul-de-sac:   Absent  Free fluid in hepatorenal space:  Absent  Impression:  No definitive IUP  Charge?:  No (educational)    Labs Reviewed   COVID/RSV/FLU A&B PCR - Abnormal       Result Value    Influenza A PCR Detected (*)     Influenza B PCR Not Detected      Respiratory Syncytial Virus PCR Not Detected      SARS-CoV-2 PCR Not Detected      Narrative:     The Xpert Xpress SARS-CoV-2/FLU/RSV plus is a rapid, multiplexed real-time PCR test intended for the simultaneous qualitative detection and differentiation of SARS-CoV-2, Influenza A, Influenza B, and respiratory syncytial virus (RSV) viral RNA in either nasopharyngeal swab or nasal swab specimens.         HCG QUALITATIVE URINE - Abnormal    hCG Qualitative, Urine Positive (*)    HCG, QUANTITATIVE - Abnormal    Beta HCG Quant 44,750.42 (*)    CBC WITH DIFFERENTIAL - Abnormal    WBC 4.82      RBC 4.13 (*)     Hgb 10.2 (*)     Hct 32.7 (*)     MCV 79.2 (*)     MCH 24.7 (*)     MCHC 31.2 (*)     RDW 18.0 (*)     Platelet 280      MPV 8.8      Neut % 65.6      Lymph % 17.4      Mono % 16.2      Eos % 0.2      Basophil % 0.2      Lymph # 0.84      Neut # 3.16      Mono # 0.78      Eos # 0.01      Baso # 0.01      IG# 0.02      IG% 0.4     URINALYSIS, REFLEX TO URINE CULTURE - Normal    Color, UA Yellow      Appearance, UA Clear      Specific Gravity, UA 1.015      pH, UA 6.0      Protein, UA Negative      Glucose, UA Negative      Ketones, UA Negative      Blood, UA Negative      Bilirubin, UA Negative      Urobilinogen, UA 0.2      Nitrites, UA Negative      Leukocyte Esterase, UA Negative     CBC W/ AUTO DIFFERENTIAL    Narrative:     The following orders were created for panel order CBC auto differential.  Procedure                               Abnormality         Status                     ---------                               -----------         ------                     CBC with Differential[[id number]]       Abnormal            Final result                 Please view  results for these tests on the individual orders.          Imaging Results              US OB <14 Wks TransAbd & TransVag, Single Gestation (XPD) (In process)                      Medications - No data to display  Medical Decision Making  Amount and/or Complexity of Data Reviewed  Labs: ordered. Decision-making details documented in ED Course.  Radiology: ordered.    Differential includes:  COVID, flu, RSV, URI, UTI, threatened Ab, ectopic, spontaneous Ab.  Will swab for flu/COVID/RSV and obtain UA, UPT.  If pregnancy confirmed, will obtain US.           ED Course as of 25 POCUS indeterminate for IUP.  I see a G-sac and a very questionable fetal pole, but the images are degraded due to habitus.  Will call out sonographer for formal US.  [CL]    No definitive IUP on US despite beta-hCG > 44K (irregular G-sac, no YS, no fetal pole).  Will enter into [facility] for OB consultation. [CL]    [facility] put me in contact with Dr. Braun (OB/Gyn at [facility]) who recommends repeat beta-hCG and repeat US in 48 hours - sooner if symptoms worsen.  I discussed this with patient and she is agreeable with the plan. [CL]      ED Course User Index  [CL] Bassam Sanchez MD                           Clinical Impression:  Final diagnoses:  [J10.1] Influenza A (Primary)  [O20.0] Threatened           ED Disposition Condition    Discharge Stable          ED Prescriptions       Medication Sig Dispense Start Date End Date Auth. Provider    benzonatate (TESSALON) 200 MG capsule Take 1 capsule (200 mg total) by mouth 3 (three) times daily as needed for Cough. 30 capsule 2025 Bassam Sanchez MD          Follow-up Information       Follow up With Specialties Details Why Contact Info    Report to ER in 48 hours for repeat beat-hCG and repeat ultrasound                 Bassam Sanchez MD  [id number]

## 2025-07-22 ENCOUNTER — HOSPITAL ENCOUNTER (EMERGENCY)
Facility: HOSPITAL | Age: 30
Discharge: HOME | End: 2025-07-22
Attending: INTERNAL MEDICINE
Payer: MEDICAID

## 2025-07-22 VITALS
TEMPERATURE: 98 F | WEIGHT: 224.63 LBS | OXYGEN SATURATION: 100 % | DIASTOLIC BLOOD PRESSURE: 75 MMHG | HEART RATE: 73 BPM | SYSTOLIC BLOOD PRESSURE: 142 MMHG | RESPIRATION RATE: 16 BRPM | BODY MASS INDEX: 35.18 KG/M2

## 2025-07-22 DIAGNOSIS — R68.84 JAW PAIN: Primary | ICD-10-CM

## 2025-07-22 PROCEDURE — 25000003 PHARM REV CODE 250: Performed by: INTERNAL MEDICINE

## 2025-07-22 PROCEDURE — 99283 EMERGENCY DEPT VISIT LOW MDM: CPT

## 2025-07-22 RX ORDER — DIAZEPAM 5 MG/1
5 TABLET ORAL
Status: COMPLETED | OUTPATIENT
Start: 2025-07-22 | End: 2025-07-22

## 2025-07-22 RX ADMIN — DIAZEPAM 5 MG: 5 TABLET ORAL at 09:07

## 2025-07-22 NOTE — Clinical Note
"Angela Abbott"Vira was seen and treated in our emergency department on 7/22/2025.  She may return to work on 07/23/2025.       If you have any questions or concerns, please don't hesitate to call.      Dwight Rizzo, DO"

## 2025-07-23 NOTE — ED PROVIDER NOTES
Encounter Date: 2025       History     Chief Complaint   Patient presents with    Jaw Pain     States after yawn 30 mins ago lower left jaw locked.      29-year-old female with no significant past medical history presenting with jaw pain.  Patient reports approximately 30 minutes prior to arrival she had yawned and opened her mouth quite large whenever her jaw stuck open she was unable to close it.  She states over 10 minutes her jaw slowly be in the close and now head is since completely closed.  She is able to talk and move her jaw without difficulty but she is noticing tenderness over the left lower jaw.  She did have some teeth pulled a proximally 3 months ago but has not had any dental pain or jaw pain prior for this.  She has never had any prior incident that are similar.  She is denying sore throat, fevers nausea, neck pain, gingival bleeding, dental pain, headache, visual changes, voice change    The history is provided by the patient and medical records.     Review of patient's allergies indicates:  No Known Allergies  No past medical history on file.  Past Surgical History:   Procedure Laterality Date     SECTION       No family history on file.  Social History[1]  Review of Systems   Constitutional:  Negative for chills, fatigue and fever.   HENT:  Negative for congestion, dental problem, drooling, ear discharge, ear pain, facial swelling, hearing loss, mouth sores, nosebleeds, postnasal drip, rhinorrhea, sinus pressure, sinus pain, sneezing, sore throat, tinnitus, trouble swallowing and voice change.         Left lower jaw pain   Eyes:  Negative for visual disturbance.   Respiratory:  Negative for shortness of breath.    Cardiovascular:  Negative for chest pain.   Gastrointestinal:  Negative for abdominal pain, nausea and vomiting.   Endocrine: Negative for polyuria.   Genitourinary:  Negative for dysuria, flank pain and hematuria.   Musculoskeletal:  Negative for back pain, myalgias and  neck pain.   Skin:  Negative for rash.   Allergic/Immunologic: Negative for immunocompromised state.   Neurological:  Negative for dizziness, weakness, light-headedness, numbness and headaches.   Hematological:  Does not bruise/bleed easily.       Physical Exam     Initial Vitals [07/22/25 2009]   BP Pulse Resp Temp SpO2   (!) 154/95 74 16 97.5 °F (36.4 °C) 100 %      MAP       --         Physical Exam    Constitutional: She appears well-developed and well-nourished. She is not diaphoretic. She is cooperative.  Non-toxic appearance. She does not appear ill.   HENT:   Head: Normocephalic and atraumatic. Head is without abrasion, without contusion and without laceration.   Right Ear: Hearing, tympanic membrane, external ear and ear canal normal.   Left Ear: Hearing, tympanic membrane, external ear and ear canal normal.   Nose: Nose normal. Mouth/Throat: Uvula is midline, oropharynx is clear and moist and mucous membranes are normal. She does not have dentures. No oral lesions. No trismus in the jaw. Abnormal dentition. Dental caries present. No dental abscesses, uvula swelling or lacerations.   Tenderness to palpation over left mandible over the body of mandible without deformities, swelling, abrasions.   Eyes: Conjunctivae and EOM are normal. Pupils are equal, round, and reactive to light.   Neck: Trachea normal and phonation normal. Neck supple. No thyroid mass present.    Full passive range of motion without pain.     Cardiovascular:  Normal rate, regular rhythm, normal heart sounds, intact distal pulses and normal pulses.           No murmur heard.  Pulmonary/Chest: No accessory muscle usage. No tachypnea. No respiratory distress. She has no decreased breath sounds. She has no wheezes. She has no rhonchi. She has no rales.   Abdominal: Abdomen is soft. Bowel sounds are normal. She exhibits no distension. There is no abdominal tenderness. No hernia. There is no rebound and no guarding.   Musculoskeletal:       Cervical back: Full passive range of motion without pain and neck supple. No spinous process tenderness or muscular tenderness.     Neurological: She is alert and oriented to person, place, and time. GCS eye subscore is 4. GCS verbal subscore is 5. GCS motor subscore is 6.   Skin: Skin is warm, dry and intact. Capillary refill takes less than 2 seconds. No rash noted.   Psychiatric: She has a normal mood and affect. Her speech is normal and behavior is normal. Thought content normal.         ED Course   Procedures  Labs Reviewed - No data to display       Imaging Results    None          Medications   diazePAM tablet 5 mg (5 mg Oral Given 7/22/25 2151)     Medical Decision Making  29-year-old female presenting with left jaw pain after temporary dislocation    Differential Diagnosis:   Judging by the patient's chief complaint and pertinent history, the patient has the following possible differential diagnoses, including but not limited to the following.  Some of these are deemed to be lower likelihood and some more likely based on my physical exam and history combined with possible lab work and/or imaging studies.   Please see the pertinent studies, and refer to the HPI.  Some of these diagnoses will take further evaluation to fully rule out, perhaps as an outpatient and the patient was encouraged to follow up when discharged for more comprehensive evaluation    Dislocation, strain, sprain, fracture, muscle spasm    Problems Addressed:  Jaw pain: self-limited or minor problem    Amount and/or Complexity of Data Reviewed  External Data Reviewed: radiology and notes.    Risk  OTC drugs.               ED Course as of 07/22/25 2234   Tue Jul 22, 2025 2156 Patient did not want to take her do we off, refused x-ray.  I believe that is appropriate as there was no trauma and likely no fracture.  Discussed with the patient's likely had a slight dislocation with a masseter spasm as she opened her jaw to significantly.  Likely  soreness secondary to masseter spasm.  She was given a dose of Valium in the ED, informed patient to use ice/heat/anti-inflammatories as needed for pain relief but stressed the importance of follow up with PCP for re-evaluation.  Strict return precautions given.  Patient verbalized an understanding of the plan and agreed. [MM]      ED Course User Index  [MM] Dwight Rizzo DO                               Clinical Impression:  Final diagnoses:  [R68.84] Jaw pain (Primary)          ED Disposition Condition    Discharge Stable          ED Prescriptions    None       Follow-up Information       Follow up With Specialties Details Why Contact Info    Mj Pinzon MD Family Medicine Call in 1 day For emergency department follow up [address]  [place] [address]  [phone]                     [1]   Social History  Tobacco Use    Smoking status: Some Days     Types: Vaping with nicotine    Smokeless tobacco: Never   Substance Use Topics    Alcohol use: Not Currently    Drug use: Not Currently        Dwight Rizzo DO  07/22/25 [id number]

## 2025-07-23 NOTE — DISCHARGE INSTRUCTIONS
You may use ice, heat, ibuprofen, Tylenol as needed for relief of soreness in the jaw.  It may take another couple of days for the pain to improve.